# Patient Record
Sex: FEMALE | Race: BLACK OR AFRICAN AMERICAN | NOT HISPANIC OR LATINO | Employment: OTHER | URBAN - METROPOLITAN AREA
[De-identification: names, ages, dates, MRNs, and addresses within clinical notes are randomized per-mention and may not be internally consistent; named-entity substitution may affect disease eponyms.]

---

## 2018-04-25 ENCOUNTER — INITIAL CONSULT (OUTPATIENT)
Dept: OPHTHALMOLOGY | Facility: CLINIC | Age: 28
End: 2018-04-25
Payer: MEDICARE

## 2018-04-25 ENCOUNTER — CLINICAL SUPPORT (OUTPATIENT)
Dept: OPHTHALMOLOGY | Facility: CLINIC | Age: 28
End: 2018-04-25
Payer: MEDICARE

## 2018-04-25 DIAGNOSIS — H53.413 CENTRAL SCOTOMA, BOTH EYES: ICD-10-CM

## 2018-04-25 DIAGNOSIS — H47.10 OPTIC ATROPHY SECONDARY TO PAPILLEDEMA: Primary | ICD-10-CM

## 2018-04-25 DIAGNOSIS — H47.20 OPTIC ATROPHY SECONDARY TO PAPILLEDEMA: Primary | ICD-10-CM

## 2018-04-25 DIAGNOSIS — H47.10 OPTIC ATROPHY SECONDARY TO PAPILLEDEMA: ICD-10-CM

## 2018-04-25 DIAGNOSIS — H47.20 OPTIC ATROPHY SECONDARY TO PAPILLEDEMA: ICD-10-CM

## 2018-04-25 PROCEDURE — 92004 COMPRE OPH EXAM NEW PT 1/>: CPT | Mod: S$PBB,,, | Performed by: OPHTHALMOLOGY

## 2018-04-25 PROCEDURE — 92133 CPTRZD OPH DX IMG PST SGM ON: CPT | Mod: PBBFAC | Performed by: OPHTHALMOLOGY

## 2018-04-25 PROCEDURE — 92083 EXTENDED VISUAL FIELD XM: CPT | Mod: PBBFAC | Performed by: OPHTHALMOLOGY

## 2018-04-25 PROCEDURE — 99999 PR PBB SHADOW E&M-EST. PATIENT-LVL II: CPT | Mod: PBBFAC,,, | Performed by: OPHTHALMOLOGY

## 2018-04-25 PROCEDURE — 99212 OFFICE O/P EST SF 10 MIN: CPT | Mod: PBBFAC,25 | Performed by: OPHTHALMOLOGY

## 2018-04-25 RX ORDER — MEDROXYPROGESTERONE ACETATE 10 MG/1
TABLET ORAL
COMMUNITY
Start: 2018-04-24

## 2018-04-25 RX ORDER — ACETAZOLAMIDE 250 MG/1
TABLET ORAL
Qty: 120 TABLET | Refills: 11 | Status: SHIPPED | OUTPATIENT
Start: 2018-04-25

## 2018-04-25 NOTE — LETTER
April 25, 2018      Elías Davison MD  [address] [place] [address]           [facility]hthalmology  [address]  [facility] [address]  Phone: [phone]  Fax: [phone]          Patient: Gwen Dodson   MR Number: [id number]   YOB: 1990   Date of Visit: 4/25/2018       Dear Dr. Elías Davison:    Thank you for referring Gwen Dodson to me for evaluation. Attached you will find relevant portions of my assessment and plan of care.    If you have questions, please do not hesitate to call me. I look forward to following Gwen Dodson along with you.    Sincerely,    Carter Potts MD    Enclosure  CC:  No Recipients    If you would like to receive this communication electronically, please contact [email or web address] or [phone] to request more information on [employer] access.    For providers and/or their staff who would like to refer a patient to [facility], please contact us through our one-stop-shop provider referral line, [facility], at [phone].    If you feel you have received this communication in error or would no longer like to receive these types of communications, please e-mail [email or web address]

## 2018-04-25 NOTE — PROGRESS NOTES
HPI     Patient has been referred by Dr. Elías Krause of [facility]. Patient was diagnosed with pseudotumor cerebri in 2014. Patient   complain of frequent  blurry vision OU.     (-) floaters   (-) flashes of light     I have personally interviewed the patient, reviewed the history and   examined the patient and agree with the technician's exam.    She had bilateral optic nerve sheath fenestration in 2014. Her vision   became worse after surgery. Was last on acetazolamide 2 years ago.    Last edited by Carter Potts MD on 4/25/2018  2:51 PM. (History)            Assessment /Plan     For exam results, see Encounter Report.    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 She has no active edema at this point.  I do not feel that a shunt procedure would improve her vision at this point. I recommended she contact the [facility] in [place] regarding rehabilitation and possible low vision aids. I recommended she have an annual eye exam to maintain normal eye health. She will determine in a year if she will do her follow up care here or closer to home.